# Patient Record
Sex: MALE | Race: WHITE | NOT HISPANIC OR LATINO
[De-identification: names, ages, dates, MRNs, and addresses within clinical notes are randomized per-mention and may not be internally consistent; named-entity substitution may affect disease eponyms.]

---

## 2019-02-26 PROBLEM — Z00.00 ENCOUNTER FOR PREVENTIVE HEALTH EXAMINATION: Status: ACTIVE | Noted: 2019-02-26

## 2019-02-27 ENCOUNTER — APPOINTMENT (OUTPATIENT)
Dept: OTOLARYNGOLOGY | Facility: CLINIC | Age: 49
End: 2019-02-27
Payer: COMMERCIAL

## 2019-02-27 VITALS
DIASTOLIC BLOOD PRESSURE: 80 MMHG | HEIGHT: 69 IN | OXYGEN SATURATION: 98 % | BODY MASS INDEX: 25.18 KG/M2 | TEMPERATURE: 97.8 F | SYSTOLIC BLOOD PRESSURE: 124 MMHG | WEIGHT: 170 LBS | RESPIRATION RATE: 17 BRPM | HEART RATE: 63 BPM

## 2019-02-27 DIAGNOSIS — H92.01 OTALGIA, RIGHT EAR: ICD-10-CM

## 2019-02-27 DIAGNOSIS — Z80.9 FAMILY HISTORY OF MALIGNANT NEOPLASM, UNSPECIFIED: ICD-10-CM

## 2019-02-27 DIAGNOSIS — Z82.49 FAMILY HISTORY OF ISCHEMIC HEART DISEASE AND OTHER DISEASES OF THE CIRCULATORY SYSTEM: ICD-10-CM

## 2019-02-27 DIAGNOSIS — Z86.59 PERSONAL HISTORY OF OTHER MENTAL AND BEHAVIORAL DISORDERS: ICD-10-CM

## 2019-02-27 DIAGNOSIS — H93.90 UNSPECIFIED DISORDER OF EAR, UNSPECIFIED EAR: ICD-10-CM

## 2019-02-27 DIAGNOSIS — Z78.9 OTHER SPECIFIED HEALTH STATUS: ICD-10-CM

## 2019-02-27 DIAGNOSIS — M51.26 OTHER INTERVERTEBRAL DISC DISPLACEMENT, LUMBAR REGION: ICD-10-CM

## 2019-02-27 DIAGNOSIS — H66.91 OTITIS MEDIA, UNSPECIFIED, RIGHT EAR: ICD-10-CM

## 2019-02-27 PROCEDURE — 92557 COMPREHENSIVE HEARING TEST: CPT

## 2019-02-27 PROCEDURE — 92550 TYMPANOMETRY & REFLEX THRESH: CPT

## 2019-02-27 PROCEDURE — 99204 OFFICE O/P NEW MOD 45 MIN: CPT

## 2019-02-27 RX ORDER — ESCITALOPRAM OXALATE 10 MG/1
10 TABLET, FILM COATED ORAL
Refills: 0 | Status: ACTIVE | COMMUNITY

## 2019-02-27 NOTE — PROCEDURE
[Unable to Cooperate with Mirror] : patient unable to cooperate with mirror [Complicated Symptoms] : complicated symptoms requiring more thorough examination than provided by mirror [Topical Lidocaine] : topical lidocaine [Oxymetazoline HCl] : oxymetazoline HCl [Flexible Endoscope] : examined with the flexible endoscope [Serial Number: ___] : Serial Number: [unfilled] [Normal] : posterior cricoid area had healthy pink mucosa in the interarytenoid area and the esophageal inlet [de-identified] : performed to r/o npx mass causing ear pain

## 2019-02-27 NOTE — HISTORY OF PRESENT ILLNESS
[de-identified] : 47 yo man with r ear soreness for 1-2 weeks. He saw Dr Vaughan and was started on augmentin and has taken it for 5d. He was also started on flonase and ear drops. He reports he is somewhat better but still has moderate dull fullness of r ear. No nasal congestion. He grinds his teeth . -f/s/c has not had this before. He does not think he has hl but is not sure. no vertigo

## 2019-02-27 NOTE — PHYSICAL EXAM
[de-identified] : r tm pionk; l nl [Nasal Endoscopy Performed] : nasal endoscopy was performed, see procedure section for findings [Midline] : trachea located in midline position [Normal] : no rashes

## 2024-10-01 ENCOUNTER — NON-APPOINTMENT (OUTPATIENT)
Age: 54
End: 2024-10-01

## 2024-10-01 ENCOUNTER — APPOINTMENT (OUTPATIENT)
Dept: OPHTHALMOLOGY | Facility: CLINIC | Age: 54
End: 2024-10-01
Payer: COMMERCIAL

## 2024-10-01 PROCEDURE — 92015 DETERMINE REFRACTIVE STATE: CPT

## 2024-10-01 PROCEDURE — 92020 GONIOSCOPY: CPT

## 2024-10-01 PROCEDURE — 92014 COMPRE OPH EXAM EST PT 1/>: CPT

## 2024-10-01 PROCEDURE — 92250 FUNDUS PHOTOGRAPHY W/I&R: CPT

## 2024-10-01 PROCEDURE — 92083 EXTENDED VISUAL FIELD XM: CPT
